# Patient Record
Sex: FEMALE | URBAN - METROPOLITAN AREA
[De-identification: names, ages, dates, MRNs, and addresses within clinical notes are randomized per-mention and may not be internally consistent; named-entity substitution may affect disease eponyms.]

---

## 2021-09-27 ENCOUNTER — APPOINTMENT (RX ONLY)
Dept: URBAN - METROPOLITAN AREA CLINIC 23 | Facility: CLINIC | Age: 76
Setting detail: DERMATOLOGY
End: 2021-09-27

## 2021-09-27 DIAGNOSIS — L82.0 INFLAMED SEBORRHEIC KERATOSIS: ICD-10-CM

## 2021-09-27 DIAGNOSIS — Z41.9 ENCOUNTER FOR PROCEDURE FOR PURPOSES OTHER THAN REMEDYING HEALTH STATE, UNSPECIFIED: ICD-10-CM

## 2021-09-27 PROCEDURE — ? DYSPORT

## 2021-09-27 PROCEDURE — ? CANDELA NORDLYS

## 2021-09-27 PROCEDURE — 17110 DESTRUCTION B9 LES UP TO 14: CPT

## 2021-09-27 PROCEDURE — ? COUNSELING

## 2021-09-27 PROCEDURE — ? BENIGN DESTRUCTION

## 2021-09-27 ASSESSMENT — LOCATION DETAILED DESCRIPTION DERM
LOCATION DETAILED: LEFT LATERAL FOREHEAD
LOCATION DETAILED: RIGHT LATERAL FOREHEAD

## 2021-09-27 ASSESSMENT — LOCATION ZONE DERM: LOCATION ZONE: FACE

## 2021-09-27 ASSESSMENT — LOCATION SIMPLE DESCRIPTION DERM
LOCATION SIMPLE: RIGHT FOREHEAD
LOCATION SIMPLE: LEFT FOREHEAD

## 2021-09-27 NOTE — PROCEDURE: DYSPORT
Show Depressor Anguli Units: Yes
Additional Area 6 Units: 0
Expiration Date (Month Year): 2022-06
Show Right And Left Brow Units: No
Periorbital Skin Units: [address] [facility]
Additional Area 2 Location: neck bands
Lot #: [id number]
Detail Level: Detailed
Additional Area 5 Location: bunny lines
Additional Area 3 Location: high forehead
Dilution (U/ 0.1cc): 1.5
Additional Area 1 Location: lateral brow
Consent: Written consent obtained. Risks include but not limited to lid/brow ptosis, bruising, swelling, diplopia, temporary effect, incomplete chemical denervation.
Glabellar Complex Units: [address]
Post-Care Instructions: Patient instructed to not lie down for 4 hours, limit physical activity for 24 hours and avoid manipulation of the treated areas.
Additional Area 4 Location: crows feet
Additional Area 6 Location: left lateral brow

## 2021-09-27 NOTE — PROCEDURE: CANDELA NORDLYS
Fluence In J/Cm2: 100
Treatment Number (Will Not Render If Zero): 0
Consent: Written consent obtained, risks reviewed including but not limited to crusting, scabbing, blistering, scarring, darker or lighter pigmentary change, and/or incomplete removal.
Modality: SWT
Applicator: VL ([phone])
Post-Care Instructions: I reviewed with the patient in detail post-care instructions.
Fluence In J/Cm2(Optional): 8.8
Eye Shielding Text (Leave Blank If Unwanted- Will Be Inserted If Selecting Eye Shields): The intraocular eye shields were placed. 2 drops of intraocular tetracaine HCL ophthalmic 0.5% solution was administered. The eye shields were coated with ophthalmic bacitracin prior to insertion. After the shields were removed the eyes were flushed with normal saline.
Treatment Number (Will Not Render If Zero): 1
Hsv Prophylaxis: no
Hsv Prophylaxis Prescription: Valtrex 500mg BID starting the day of procedures and continuing until all sites healed
Fluence In J/Cm2(Optional): 13.0
Detail Level: Zone

## 2021-09-27 NOTE — PROCEDURE: BENIGN DESTRUCTION
Render Post-Care Instructions In Note?: no
Anesthesia Volume In Cc: 0.3
Medical Necessity Information: It is in your best interest to select a reason for this procedure from the list below. All of these items fulfill various CMS LCD requirements except the new and changing color options.
Detail Level: Zone
Post-Care Instructions: I reviewed with the patient in detail post-care instructions. Patient is to wear sunprotection, and avoid picking at any of the treated lesions. Pt may apply Vaseline to crusted or scabbing areas. Wash off in 2 hours.
Total Number Of Lesions Treated: 2
Consent: The patient parent's consent was obtained including but not limited to risks of crusting, scabbing, blistering, scarring, darker or lighter pigmentary change, recurrence, incomplete removal and infection.
Medical Necessity Clause: This procedure was medically necessary because the lesions that were treated were: